# Patient Record
Sex: MALE | Race: ASIAN | NOT HISPANIC OR LATINO
[De-identification: names, ages, dates, MRNs, and addresses within clinical notes are randomized per-mention and may not be internally consistent; named-entity substitution may affect disease eponyms.]

---

## 2022-07-20 PROBLEM — Z00.00 ENCOUNTER FOR PREVENTIVE HEALTH EXAMINATION: Status: ACTIVE | Noted: 2022-07-20

## 2022-07-22 ENCOUNTER — APPOINTMENT (OUTPATIENT)
Dept: ORTHOPEDIC SURGERY | Facility: CLINIC | Age: 57
End: 2022-07-22

## 2022-07-22 VITALS — BODY MASS INDEX: 25.43 KG/M2 | HEIGHT: 67 IN | WEIGHT: 162 LBS

## 2022-07-22 PROCEDURE — 20550 NJX 1 TENDON SHEATH/LIGAMENT: CPT

## 2022-07-22 PROCEDURE — J3490M: CUSTOM

## 2022-07-22 PROCEDURE — 99203 OFFICE O/P NEW LOW 30 MIN: CPT | Mod: 25

## 2022-07-26 NOTE — HISTORY OF PRESENT ILLNESS
[Gradual] : gradual [6] : 6 [2] : 2 [Dull/Aching] : dull/aching [Sharp] : sharp [Nothing helps with pain getting better] : Nothing helps with pain getting better [de-identified] : R thumb stuck in flex [] : no [FreeTextEntry1] : right thumb [FreeTextEntry3] : 3 months ago  [FreeTextEntry5] : he is having pain,its clicking and can't bend his right thumb  [de-identified] : activity  [de-identified] : 03/2022 [de-identified] : PCP

## 2022-07-26 NOTE — PHYSICAL EXAM
[de-identified] : R hand: \par Mild swelling \par Tender 1st A1 pulley \par Decreased thumb ROM \par +thumb triggering

## 2022-08-19 ENCOUNTER — APPOINTMENT (OUTPATIENT)
Dept: ORTHOPEDIC SURGERY | Facility: CLINIC | Age: 57
End: 2022-08-19
Payer: COMMERCIAL

## 2022-08-19 VITALS — BODY MASS INDEX: 25.43 KG/M2 | WEIGHT: 162 LBS | HEIGHT: 67 IN

## 2022-08-19 PROCEDURE — 99214 OFFICE O/P EST MOD 30 MIN: CPT | Mod: 57

## 2022-08-19 PROCEDURE — 99213 OFFICE O/P EST LOW 20 MIN: CPT | Mod: 57

## 2022-08-19 NOTE — ASSESSMENT
[FreeTextEntry1] : For R trigger thumb release\par R/B/A of surgery discussed with the patient. Risks including but not limited to infection, nerve damage, tendon damage, pain, stiffness, recurrence, no resolution of symptoms, loss of function, limb or life. They understand and agree to surgery \par Return post op

## 2022-08-19 NOTE — PHYSICAL EXAM
[de-identified] : R hand: \par Mild swelling \par Tender 1st A1 pulley \par Decreased thumb ROM \par +thumb triggering

## 2022-08-19 NOTE — HISTORY OF PRESENT ILLNESS
[2] : 2 [Dull/Aching] : dull/aching [de-identified] : R trigger thumb injection helped but not 100% [FreeTextEntry1] : R thumb [de-identified] : none

## 2022-08-31 ENCOUNTER — APPOINTMENT (OUTPATIENT)
Age: 57
End: 2022-08-31

## 2022-08-31 PROCEDURE — 26055 INCISE FINGER TENDON SHEATH: CPT | Mod: F5

## 2022-08-31 PROCEDURE — 26055 INCISE FINGER TENDON SHEATH: CPT | Mod: AS,F5

## 2022-08-31 RX ORDER — HYDROCODONE BITARTRATE AND ACETAMINOPHEN 5; 325 MG/1; MG/1
5-325 TABLET ORAL EVERY 4 HOURS
Qty: 10 | Refills: 0 | Status: ACTIVE | COMMUNITY
Start: 2022-08-31 | End: 1900-01-01

## 2022-09-09 ENCOUNTER — APPOINTMENT (OUTPATIENT)
Dept: ORTHOPEDIC SURGERY | Facility: CLINIC | Age: 57
End: 2022-09-09

## 2022-09-09 VITALS — BODY MASS INDEX: 25.43 KG/M2 | HEIGHT: 67 IN | WEIGHT: 162 LBS

## 2022-09-09 VITALS — WEIGHT: 162 LBS | HEIGHT: 67 IN | BODY MASS INDEX: 25.43 KG/M2

## 2022-09-09 DIAGNOSIS — M65.311 TRIGGER THUMB, RIGHT THUMB: ICD-10-CM

## 2022-09-09 PROCEDURE — 99024 POSTOP FOLLOW-UP VISIT: CPT

## 2022-09-09 NOTE — PHYSICAL EXAM
[de-identified] : Mild hand swelling\par Healed incision\par No evidence of infection\par Mild tenderness at the surgical site\par Good finger ROM\par No triggering

## 2022-09-09 NOTE — HISTORY OF PRESENT ILLNESS
[3] : 3 [2] : 2 [Dull/Aching] : dull/aching [] : Post Surgical Visit: yes [de-identified] : R trigger thumb release\par Doing well [FreeTextEntry1] : r thumb [de-identified] : 8/31/22 [de-identified] : right trigger thumb release

## 2022-09-09 NOTE — PHYSICAL EXAM
[de-identified] : Mild hand swelling\par Healed incision\par No evidence of infection\par Mild tenderness at the surgical site\par Good finger ROM\par No triggering

## 2022-09-09 NOTE — ASSESSMENT
[FreeTextEntry1] : Sutures removed\par Steris applied\par LIght activities and advance as tolerated\par Return prn\par

## 2022-09-09 NOTE — HISTORY OF PRESENT ILLNESS
[3] : 3 [2] : 2 [Dull/Aching] : dull/aching [] : Post Surgical Visit: yes [de-identified] : R trigger thumb release\par Doing well [FreeTextEntry1] : r thumb [de-identified] : 8/31/22 [de-identified] : right trigger thumb release

## 2022-10-06 ENCOUNTER — APPOINTMENT (OUTPATIENT)
Dept: DERMATOLOGY | Facility: CLINIC | Age: 57
End: 2022-10-06